# Patient Record
Sex: MALE | Race: WHITE | URBAN - METROPOLITAN AREA
[De-identification: names, ages, dates, MRNs, and addresses within clinical notes are randomized per-mention and may not be internally consistent; named-entity substitution may affect disease eponyms.]

---

## 2018-01-01 ENCOUNTER — OFFICE VISIT (OUTPATIENT)
Dept: RADIATION ONCOLOGY | Facility: CLINIC | Age: 63
End: 2018-01-01
Attending: RADIOLOGY
Payer: COMMERCIAL

## 2018-01-01 ENCOUNTER — MEDICAL CORRESPONDENCE (OUTPATIENT)
Dept: HEALTH INFORMATION MANAGEMENT | Facility: CLINIC | Age: 63
End: 2018-01-01

## 2018-01-01 ENCOUNTER — TELEPHONE (OUTPATIENT)
Dept: RADIATION ONCOLOGY | Facility: CLINIC | Age: 63
End: 2018-01-01

## 2018-01-01 ENCOUNTER — APPOINTMENT (OUTPATIENT)
Dept: MEDSURG UNIT | Facility: CLINIC | Age: 63
End: 2018-01-01
Payer: COMMERCIAL

## 2018-01-01 ENCOUNTER — PRE VISIT (OUTPATIENT)
Dept: RADIATION ONCOLOGY | Facility: CLINIC | Age: 63
End: 2018-01-01

## 2018-01-01 ENCOUNTER — HOSPITAL ENCOUNTER (OUTPATIENT)
Dept: MRI IMAGING | Facility: CLINIC | Age: 63
Discharge: HOME | End: 2018-05-02
Attending: NEUROLOGICAL SURGERY | Admitting: NEUROLOGICAL SURGERY
Payer: COMMERCIAL

## 2018-01-01 VITALS
OXYGEN SATURATION: 96 % | DIASTOLIC BLOOD PRESSURE: 68 MMHG | SYSTOLIC BLOOD PRESSURE: 117 MMHG | RESPIRATION RATE: 20 BRPM | HEART RATE: 103 BPM

## 2018-01-01 VITALS
BODY MASS INDEX: 27.15 KG/M2 | SYSTOLIC BLOOD PRESSURE: 132 MMHG | DIASTOLIC BLOOD PRESSURE: 75 MMHG | WEIGHT: 173 LBS | RESPIRATION RATE: 18 BRPM | HEART RATE: 125 BPM | OXYGEN SATURATION: 95 % | HEIGHT: 67 IN

## 2018-01-01 VITALS
SYSTOLIC BLOOD PRESSURE: 116 MMHG | RESPIRATION RATE: 18 BRPM | DIASTOLIC BLOOD PRESSURE: 70 MMHG | OXYGEN SATURATION: 97 % | HEART RATE: 80 BPM

## 2018-01-01 DIAGNOSIS — C79.31 METASTASIS TO BRAIN (H): ICD-10-CM

## 2018-01-01 DIAGNOSIS — C79.31 METASTASIS TO BRAIN (H): Primary | ICD-10-CM

## 2018-01-01 DIAGNOSIS — C79.49 SECONDARY MALIGNANT NEOPLASM OF BRAIN AND SPINAL CORD (H): Primary | ICD-10-CM

## 2018-01-01 DIAGNOSIS — C79.31 SECONDARY MALIGNANT NEOPLASM OF BRAIN AND SPINAL CORD (H): Primary | ICD-10-CM

## 2018-01-01 LAB
ANION GAP SERPL CALCULATED.3IONS-SCNC: 10 MMOL/L (ref 3–14)
CHLORIDE SERPL-SCNC: 98 MMOL/L (ref 94–109)
CO2 SERPL-SCNC: 27 MMOL/L (ref 20–32)
CREAT SERPL-MCNC: 0.41 MG/DL (ref 0.66–1.25)
GFR SERPL CREATININE-BSD FRML MDRD: >90 ML/MIN/1.7M2
POTASSIUM SERPL-SCNC: 4.1 MMOL/L (ref 3.4–5.3)
RADIOLOGIST FLAGS: ABNORMAL
SODIUM SERPL-SCNC: 135 MMOL/L (ref 133–144)

## 2018-01-01 PROCEDURE — 77371 SRS MULTISOURCE: CPT | Performed by: RADIOLOGY

## 2018-01-01 PROCEDURE — 36415 COLL VENOUS BLD VENIPUNCTURE: CPT | Performed by: NEUROLOGICAL SURGERY

## 2018-01-01 PROCEDURE — 77334 RADIATION TREATMENT AID(S): CPT | Performed by: RADIOLOGY

## 2018-01-01 PROCEDURE — G0463 HOSPITAL OUTPT CLINIC VISIT: HCPCS | Performed by: RADIOLOGY

## 2018-01-01 PROCEDURE — 25000128 H RX IP 250 OP 636: Performed by: NEUROLOGICAL SURGERY

## 2018-01-01 PROCEDURE — 77295 3-D RADIOTHERAPY PLAN: CPT | Performed by: RADIOLOGY

## 2018-01-01 PROCEDURE — 82565 ASSAY OF CREATININE: CPT | Performed by: NEUROLOGICAL SURGERY

## 2018-01-01 PROCEDURE — 25000132 ZZH RX MED GY IP 250 OP 250 PS 637: Mod: ZF | Performed by: NEUROLOGICAL SURGERY

## 2018-01-01 PROCEDURE — A9585 GADOBUTROL INJECTION: HCPCS | Performed by: NEUROLOGICAL SURGERY

## 2018-01-01 PROCEDURE — 77300 RADIATION THERAPY DOSE PLAN: CPT | Performed by: RADIOLOGY

## 2018-01-01 PROCEDURE — 80051 ELECTROLYTE PANEL: CPT | Performed by: NEUROLOGICAL SURGERY

## 2018-01-01 PROCEDURE — 77370 RADIATION PHYSICS CONSULT: CPT | Performed by: RADIOLOGY

## 2018-01-01 PROCEDURE — 70552 MRI BRAIN STEM W/DYE: CPT

## 2018-01-01 PROCEDURE — 25000131 ZZH RX MED GY IP 250 OP 636 PS 637: Mod: ZF | Performed by: NEUROLOGICAL SURGERY

## 2018-01-01 PROCEDURE — 25000125 ZZHC RX 250: Mod: ZF | Performed by: NEUROLOGICAL SURGERY

## 2018-01-01 PROCEDURE — 40000172 ZZH STATISTIC PROCEDURE PREP ONLY

## 2018-01-01 RX ORDER — LORAZEPAM 0.5 MG/1
.5-1 TABLET ORAL
Status: DISCONTINUED | OUTPATIENT
Start: 2018-01-01 | End: 2018-01-01 | Stop reason: HOSPADM

## 2018-01-01 RX ORDER — GADOBUTROL 604.72 MG/ML
10 INJECTION INTRAVENOUS ONCE
Status: COMPLETED | OUTPATIENT
Start: 2018-01-01 | End: 2018-01-01

## 2018-01-01 RX ORDER — TIOTROPIUM BROMIDE 18 UG/1
18 CAPSULE ORAL; RESPIRATORY (INHALATION) DAILY
COMMUNITY

## 2018-01-01 RX ORDER — HYDROCODONE BITARTRATE AND ACETAMINOPHEN 5; 325 MG/1; MG/1
1-2 TABLET ORAL EVERY 6 HOURS PRN
Status: DISCONTINUED | OUTPATIENT
Start: 2018-01-01 | End: 2018-01-01 | Stop reason: HOSPADM

## 2018-01-01 RX ORDER — ACETAMINOPHEN 325 MG/1
650 TABLET ORAL EVERY 4 HOURS PRN
Status: DISCONTINUED | OUTPATIENT
Start: 2018-01-01 | End: 2018-01-01 | Stop reason: HOSPADM

## 2018-01-01 RX ORDER — ONDANSETRON 2 MG/ML
4 INJECTION INTRAMUSCULAR; INTRAVENOUS
Status: DISCONTINUED | OUTPATIENT
Start: 2018-01-01 | End: 2018-01-01 | Stop reason: HOSPADM

## 2018-01-01 RX ORDER — DEXAMETHASONE 4 MG/1
4 TABLET ORAL
Status: COMPLETED | OUTPATIENT
Start: 2018-01-01 | End: 2018-01-01

## 2018-01-01 RX ORDER — ONDANSETRON 4 MG/1
8 TABLET, ORALLY DISINTEGRATING ORAL EVERY 6 HOURS PRN
Status: DISCONTINUED | OUTPATIENT
Start: 2018-01-01 | End: 2018-01-01 | Stop reason: HOSPADM

## 2018-01-01 RX ORDER — LORAZEPAM 0.5 MG/1
.5-2 TABLET ORAL
Status: COMPLETED | OUTPATIENT
Start: 2018-01-01 | End: 2018-01-01

## 2018-01-01 RX ORDER — LIDOCAINE 40 MG/G
1 CREAM TOPICAL SEE ADMIN INSTRUCTIONS
Status: COMPLETED | OUTPATIENT
Start: 2018-01-01 | End: 2018-01-01

## 2018-01-01 RX ADMIN — DEXAMETHASONE 4 MG: 4 TABLET ORAL at 11:35

## 2018-01-01 RX ADMIN — LIDOCAINE 1 G: 40 CREAM TOPICAL at 06:26

## 2018-01-01 RX ADMIN — GADOBUTROL 10 ML: 604.72 INJECTION INTRAVENOUS at 08:07

## 2018-01-01 RX ADMIN — LORAZEPAM 1 MG: 0.5 TABLET ORAL at 06:32

## 2018-01-01 RX ADMIN — BUPIVACAINE HYDROCHLORIDE 30 ML: 7.5 INJECTION, SOLUTION EPIDURAL; RETROBULBAR at 06:27

## 2018-01-01 ASSESSMENT — ENCOUNTER SYMPTOMS
SPUTUM PRODUCTION: 1
FEVER: 0
BLOOD IN STOOL: 0
HEADACHES: 0
CONSTIPATION: 0
DIZZINESS: 0
COUGH: 1
BLURRED VISION: 0
WEAKNESS: 1
SEIZURES: 0
DOUBLE VISION: 0
DEPRESSION: 0
VOMITING: 0
DIARRHEA: 0
SHORTNESS OF BREATH: 1
PHOTOPHOBIA: 0
MUSCULOSKELETAL NEGATIVE: 1

## 2018-04-10 NOTE — TELEPHONE ENCOUNTER
"Date: 4/10/2018   Age: 62 year old  Ethnicity:    Sex: male  : 1955   Lives In: [place], [place]      Diagnosis: Poorly Differentiated Adenocarcinoma, consistent with Lung primary    Prior radiation therapy:   Site Treated: at  Facility: at  Dates: at  Dose: at    Site Treated: at  Facility: at  Dates: at  Dose: at    Prior chemotherapy:   Protocol: at  Facility: at  Dates: at      Pain at time of consult, management plan if yes:  Does pt have a living will:  Is Pt Pregnant:  Does Pt have any implanted cardiac devices:    Doctors to \"cc\":  Sandeep Randolph-Med/Onc,     RN time with patient:    Pt was educated on Gamma Knife Procedure         Review Since Diagnosis:    Symptoms    2016; near complete obstruction of right lung      1/10/17; biopsy of tissue pt coughed up, showed poorly differentiated adenocarcinoma consistent with pulmonary primary    Chemo and Radiation, stable since    18; Brain MRI, new 1.3 x 1 cm enhancing lesion right temporal                                  Stable 0.8 x 0.7 cm lesion overlying the left parietal lobe                                  0.5 x 1 cm lesion right IAC (previously was 0.4 x 0.7 cm)    18; pt saw Dr. Randolph, put on 4 mg dexamethasone bid, wants a PET before decides what to do with brain lesion    18; PET/CT, mildly glucose avid right cervical lymph node-nonspecific, mediastinal soft tissue mass-like density unchanged in size-no activity to suggest progressing malignancy, no mets below diaphragm    18; pt saw Dr. Randolph, doing well, referred to neurosurgery for brain met     Chief Complaint: at consult               "

## 2018-04-18 NOTE — PROGRESS NOTES
Service Date: 04/18/2018      REFERRING PHYSICIANS:  Sandeep Randolph MD, Regan Lind PA-C, [facility] and Tumor Registry      DIAGNOSIS:  Metastatic nonsmall cell lung cancer (adenocarcinoma) with a solitary right temporal brain metastasis and a right acoustic neuroma.      HISTORY OF PRESENT ILLNESS:  The patient is a 62-year-old white male with a 45-pack-year smoking history who continues to smoke.  He has had decreased hearing in the right ear for 20 years and can no longer use the telephone with the right ear.  He was otherwise in stable health until 12/2016 when he developed shortness of breath.  Chest x-ray revealed complete opacification of the right lung.  On 01/10/2017 cytology from sputum confirmed nonsmall cell lung cancer (poorly differentiated adenocarcinoma).  He underwent concurrent chemotherapy and radiation in [place], [place] completed in 04/2017.  I do not have the details of that treatment.  He was clinically and radiographically stable until 02/02/2018 when a restaging brain MRI showed a new 1.3 cm metastasis in the right posterior temporal brain and a 1 cm right acoustic neuroma.  He was evaluated by Dr. Ayoub in Neurosurgery and the patient declined surgery due to risk of surgery-related deficits.  PET scan 02/07/2018 was stable with no evidence of progressive disease.  The patient had noticed some increasing difficulty with balance and actually fell on 03/12/2018 and sustained a fracture of the left wrist.  Followup brain MRI 04/16/2018 showed no significant change in the 1.2 cm-1.3 cm right temporal brain metastasis.  Similarly, there was no change in the 1.0 cm right acoustic neuroma.  The patient was referred for consideration of his treatment options.  His chief complaint is generalized weakness, chronic right-sided deafness, his fractured left wrist and fatigue.      PAST MEDICAL HISTORY:   1.  Tobacco abuse.   2.  COPD.   3.  Gastroesophageal reflux disease.    4.  Right acoustic neuroma with right-sided deafness.      MEDICATIONS:   1.  Dexamethasone 4 mg p.o. b.i.d.  (I will initiate a gradual dexamethasone taper as tolerated).   2.  Robitussin p.r.n.   3.  Spiriva p.r.n.      ALLERGIES:  Carboplatin and contrast dye.      REVIEW OF SYSTEMS:  All systems were reviewed and found to be otherwise negative.      FAMILY HISTORY:  His mother had breast cancer.  One uncle had colon cancer and another uncle had lung cancer.      SOCIAL HISTORY:  The patient is  and has 3 children.  He lives in [place], [place].  He is actually about to be remarry his first wife on 04/21/2018.  He is on disability from a career as a .      PHYSICAL EXAMINATION:   GENERAL:  Well-developed, well-nourished male appearing older than his stated age, sitting in a wheelchair, but in no acute distress, alert and oriented, pleasant and cooperative.   VITAL SIGNS:  Afebrile.  Vital signs stable.  Height 5 feet 7 inches.  Weight 173.  BMI 27.1.  Blood pressure 132/75.  Heart rate 110.  Respirations 12.  Oxygen saturation on room air 95%.   HEENT:  Normal for age.   NECK:  Supple without adenopathy.   LUNGS:  Reveal coarse rhonchi on the right and clear breath sounds on the left.   HEART:  Regular rate and rhythm.   ABDOMEN:  Soft, nontender.   EXTREMITIES:  Without cyanosis, clubbing or edema.   NEUROLOGIC:  Reveals a mental status that is alert and appropriate.  Cranial nerves are intact.  Motor exam reveals generalized weakness but no focal weakness.  Cerebellar testing shows he has difficulty ambulating independently.  He has a cast on his left wrist.      IMPRESSION:  Metastatic nonsmall cell lung cancer (adenocarcinoma) with a 1.3 cm right posterior temporal brain metastasis and a 1.0 cm right acoustic neuroma.  The acoustic neuroma may in fact be responsible for his difficulty with balance as well as his right-sided deafness.      RECOMMENDATIONS:  The diagnosis, prognosis  and therapeutic options were reviewed with the patient, his fiancee/wife and his brother.  The brain MRI images were reviewed with them and the brain metastasis and the acoustic neuroma were pointed out.  I do not recommend whole brain radiation therapy.  I do recommend gamma knife radiosurgery to both the brain metastasis and the acoustic neuroma since it, in fact, may be responsible for his chief complaint (difficulty with balance).  The rationale for this approach as well as the procedures, risks, benefits and potential side effects were explained.  The patient and his family appear to understand and agree to proceed.  The procedure is scheduled for 2018 at the [facility] at the [facility].  As noted above, I will taper his dexamethasone as tolerated.      Thank you very much for asking me to see this pleasant gentleman and to share in his evaluation.        Williams Philippe MD      cc:   [place] [address]      [place] [address]      Tumor Registry         WILLIAMS PHILIPPE MD             D: 2018   T: 2018   MT: nh      Name:     FANY LERNER   MRN:      [id number]        Account:      [id number]   :      1955           Service Date: 2018      Document: [id number]

## 2018-04-18 NOTE — LETTER
"2018       RE: Abdirizak Allen  [address]  [place] [address]     Dear Colleague,    Thank you for referring your patient, Abdirizak Allen, to the [facility], [place], RADIATION ONCOLOGY. Please see a copy of my visit note below.      HPI    Date: 4/10/2018   Age: 62 year old  Ethnicity:    Sex: male  : 1955   Lives In: [place], [place]      Diagnosis: Poorly Differentiated Adenocarcinoma, consistent with Lung primary    Prior radiation therapy:   Site Treated: right lung  Facility: [facility]  Dates: 2017  Dose: at      Prior chemotherapy: See Below        Pain at time of consult, management plan if yes: pt denies pain at time of consult  Does pt have a living will: pt has   Is Pt Pregnant: N?A  Does Pt have any implanted cardiac devices: pt has no implanted cardiac devices    Doctors to \"cc\":  Sandeep Randolph-Med/Onc, Dr.Andrea Lind-primary at [facility]    RN time with patient: 55 minutes    Pt was educated on Gamma Knife Procedure  ;yes        Review Since Diagnosis:    Pt stating in December knew he had to go to hospital, SOB, said pneumonia, went to ER, more testing    2016; near complete obstruction of right lung      1/10/17; biopsy of tissue pt coughed up, showed poorly differentiated adenocarcinoma consistent with pulmonary primary    Chemo and Radiation, stable since in [facility]    Chemo and Radiation was all done 2017    Routine checks, everything was good, no mets    Pt starting having trouble hearing in right ear and that is what prompted a Brain MRI    18; Brain MRI, new 1.3 x 1 cm enhancing lesion right temporal                                  Stable 0.8 x 0.7 cm lesion overlying the left parietal lobe                                  0.5 x 1 cm lesion right IAC (previously was 0.4 x 0.7 cm)    18; pt saw Dr. Randolph, put on 4 mg dexamethasone bid, wants a PET before decides what to do with brain lesion    18; PET/CT, " mildly glucose avid right cervical lymph node-nonspecific, mediastinal soft tissue mass-like density unchanged in size-no activity to suggest progressing malignancy, no mets below diaphragm    2/13/18; pt saw Dr. Randolph, doing well, referred to neurosurgery for brain met     No neurosurgeon in [place] per pt request    Chief Complaint: weakness all over especially legs, pt has fallen, broken left wrist, no improvement since steroid, pt denies a headache, pt also having some slurred speech and expressive aphagia            Review of Systems   Constitutional: Positive for malaise/fatigue. Negative for fever.   HENT: Positive for hearing loss.         Hearing decreased right ear   Eyes: Negative for blurred vision, double vision and photophobia.   Respiratory: Positive for cough, sputum production and shortness of breath.    Cardiovascular: Positive for leg swelling. Negative for chest pain.        Leg swelling bilateral, compression socks   Gastrointestinal: Negative for blood in stool, constipation, diarrhea and vomiting.        Pt trouble swallowing hard foods as chokes from saliva   Musculoskeletal: Negative.    Neurological: Positive for weakness. Negative for dizziness, seizures and headaches.   Psychiatric/Behavioral: Negative for depression.                 Service Date: 04/18/2018      REFERRING PHYSICIANS:  Sandeep Randolph MD, Regan Lind PA-C, [facility] and Tumor Registry      DIAGNOSIS:  Metastatic nonsmall cell lung cancer (adenocarcinoma) with a solitary right temporal brain metastasis and a right acoustic neuroma.      HISTORY OF PRESENT ILLNESS:  The patient is a 62-year-old white male with a 45-pack-year smoking history who continues to smoke.  He has had decreased hearing in the right ear for 20 years and can no longer use the telephone with the right ear.  He was otherwise in stable health until 12/2016 when he developed shortness of breath.  Chest x-ray revealed complete  opacification of the right lung.  On 01/10/2017 cytology from sputum confirmed nonsmall cell lung cancer (poorly differentiated adenocarcinoma).  He underwent concurrent chemotherapy and radiation in [place], [place] completed in 04/2017.  I do not have the details of that treatment.  He was clinically and radiographically stable until 02/02/2018 when a restaging brain MRI showed a new 1.3 cm metastasis in the right posterior temporal brain and a 1 cm right acoustic neuroma.  He was evaluated by Dr. Ayoub in Neurosurgery and the patient declined surgery due to risk of surgery-related deficits.  PET scan 02/07/2018 was stable with no evidence of progressive disease.  The patient had noticed some increasing difficulty with balance and actually fell on 03/12/2018 and sustained a fracture of the left wrist.  Followup brain MRI 04/16/2018 showed no significant change in the 1.2 cm-1.3 cm right temporal brain metastasis.  Similarly, there was no change in the 1.0 cm right acoustic neuroma.  The patient was referred for consideration of his treatment options.  His chief complaint is generalized weakness, chronic right-sided deafness, his fractured left wrist and fatigue.      PAST MEDICAL HISTORY:   1.  Tobacco abuse.   2.  COPD.   3.  Gastroesophageal reflux disease.   4.  Right acoustic neuroma with right-sided deafness.      MEDICATIONS:   1.  Dexamethasone 4 mg p.o. b.i.d.  (I will initiate a gradual dexamethasone taper as tolerated).   2.  Robitussin p.r.n.   3.  Spiriva p.r.n.      ALLERGIES:  Carboplatin and contrast dye.      REVIEW OF SYSTEMS:  All systems were reviewed and found to be otherwise negative.      FAMILY HISTORY:  His mother had breast cancer.  One uncle had colon cancer and another uncle had lung cancer.      SOCIAL HISTORY:  The patient is  and has 3 children.  He lives in [place], [place].  He is actually about to be remarry his first wife on 04/21/2018.  He is on disability from a  career as a .      PHYSICAL EXAMINATION:   GENERAL:  Well-developed, well-nourished male appearing older than his stated age, sitting in a wheelchair, but in no acute distress, alert and oriented, pleasant and cooperative.   VITAL SIGNS:  Afebrile.  Vital signs stable.  Height 5 feet 7 inches.  Weight 173.  BMI 27.1.  Blood pressure 132/75.  Heart rate 110.  Respirations 12.  Oxygen saturation on room air 95%.   HEENT:  Normal for age.   NECK:  Supple without adenopathy.   LUNGS:  Reveal coarse rhonchi on the right and clear breath sounds on the left.   HEART:  Regular rate and rhythm.   ABDOMEN:  Soft, nontender.   EXTREMITIES:  Without cyanosis, clubbing or edema.   NEUROLOGIC:  Reveals a mental status that is alert and appropriate.  Cranial nerves are intact.  Motor exam reveals generalized weakness but no focal weakness.  Cerebellar testing shows he has difficulty ambulating independently.  He has a cast on his left wrist.      IMPRESSION:  Metastatic nonsmall cell lung cancer (adenocarcinoma) with a 1.3 cm right posterior temporal brain metastasis and a 1.0 cm right acoustic neuroma.  The acoustic neuroma may in fact be responsible for his difficulty with balance as well as his right-sided deafness.      RECOMMENDATIONS:  The diagnosis, prognosis and therapeutic options were reviewed with the patient, his fiancee/wife and his brother.  The brain MRI images were reviewed with them and the brain metastasis and the acoustic neuroma were pointed out.  I do not recommend whole brain radiation therapy.  I do recommend gamma knife radiosurgery to both the brain metastasis and the acoustic neuroma since it, in fact, may be responsible for his chief complaint (difficulty with balance).  The rationale for this approach as well as the procedures, risks, benefits and potential side effects were explained.  The patient and his family appear to understand and agree to proceed.  The procedure is scheduled for  2018 at the [facility] at the [facility].  As noted above, I will taper his dexamethasone as tolerated.      Thank you very much for asking me to see this pleasant gentleman and to share in his evaluation.        Williams Philippe MD      cc:   [place] [address]      [place] [address]      Tumor Registry         WILLIAMS PHILIPPE MD             D: 2018   T: 2018   MT: nh      Name:     FANY LERNER   MRN:      [id number]        Account:      [id number]   :      1955           Service Date: 2018      Document: [id number]

## 2018-04-18 NOTE — MR AVS SNAPSHOT
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 [facility] [id number] [phone]            May 02, 2018  8:30 AM CDT   (Arrive by 8:15 AM)   MR BRAIN W CONTRAST with UUMR1   [facility] [place], MRI ([facility] [place] [facility], [facility])    [address] [id number] [phone]           Take your medicines as usual, unless your doctor tells you not to. Bring a list of your current medicines to your exam (including vitamins, minerals and over-the-counter drugs).  You may or may not receive intravenous (IV) contrast for this exam pending the discretion of the Radiologist.  You do not need to do anything special to prepare.  The MRI machine uses a strong magnet. Please wear clothes without metal (snaps, zippers). A sweatsuit works well, or we may give you a hospital gown.  Please remove any body piercings and hair extensions before you arrive. You will also remove watches, jewelry, hairpins, wallets, dentures, partial dental plates and hearing aids. You may wear contact lenses, and you may be able to wear your rings. We have a safe place to keep your personal items, but it is safer to leave them at home.  **IMPORTANT** THE INSTRUCTIONS BELOW ARE ONLY FOR THOSE PATIENTS WHO HAVE BEEN PRESCRIBED SEDATION OR GENERAL ANESTHESIA DURING THEIR MRI PROCEDURE:  IF YOUR DOCTOR PRESCRIBED ORAL SEDATION (take medicine to  help you relax during your exam):   You must get the medicine from your doctor (oral medication) before you arrive. Bring the medicine to the exam. Do not take it at home. You ll be told when to take it upon arriving for your exam.   Arrive one hour early. Bring someone who can take you home after the test. Your medicine will make you sleepy. After the exam, you may not drive, take a bus or take a taxi by yourself.  IF YOUR DOCTOR PRESCRIBED IV SEDATION:   Arrive one hour early. Bring someone who can take you home after the test. Your medicine will make you sleepy. After the exam, you may not drive, take a bus or take a taxi by yourself.   No eating 6 hours before your exam. You may have clear liquids up until 4 hours before your exam. (Clear liquids include water, clear tea, black coffee and fruit juice without pulp.)  IF YOUR DOCTOR PRESCRIBED ANESTHESIA (be asleep for your exam):   Arrive 1 1/2 hours early. Bring someone who can take you home after the test. You may not drive, take a bus or take a taxi by yourself.   No eating 8 hours before your exam. You may have clear liquids up until 4 hours before your exam. (Clear liquids include water, clear tea, black coffee and fruit juice without pulp.)   You will spend four to five hours in the recovery room.  Please call the Imaging Department at your exam site with any questions.              Who to contact     Please call your clinic at [phone] to:    Ask questions about your health    Make or cancel appointments    Discuss your medicines    Learn about your test results    Speak to your doctor            Additional Information About Your Visit        [employer] Information     [employer] gives you secure access to your electronic health record. If you see a primary care provider, you can also send messages to your care team and make appointments. If you have questions, please call your primary care clinic.  If you do not have a primary care provider, please call  "[phone] and they will assist you.      [employer] is an electronic gateway that provides easy, online access to your medical records. With [employer], you can request a clinic appointment, read your test results, renew a prescription or communicate with your care team.     To access your existing account, please contact your [facility] or call [phone] for assistance.        Care EveryWhere ID     This is your Care EveryWhere ID. This could be used by other organizations to access your [facility] medical records  [id number]        Your Vitals Were     Pulse Respirations Height Pulse Oximetry BMI (Body Mass Index)       125 18 1.702 m (5' 7\") 95% 27.1 kg/m2        Blood Pressure from Last 3 Encounters:   04/18/18 132/75    Weight from Last 3 Encounters:   04/18/18 78.5 kg (173 lb)              Today, you had the following     No orders found for display       Primary Care Provider    None Specified       No primary provider on file.        Equal Access to Services     CLIFFORD FLETCHER : Hadii jose de jesus landiso Soarias, waaxda luqadaha, qaybta kaalmada adericco, katilynn pope . So [facility] [phone].    ATENCIÓN: Si habla español, tiene a crenshaw disposición servicios gratuitos de asistencia lingüística. Llame al [phone].    We comply with applicable federal civil rights laws and [place] laws. We do not discriminate on the basis of race, color, national origin, age, disability, sex, sexual orientation, or gender identity.            Thank you!     Thank you for choosing [facility], RADIATION ONCOLOGY  for your care. Our goal is always to provide you with excellent care. Hearing back from our patients is one way we can continue to improve our services. Please take a few minutes to complete the written survey that you may receive in the mail after your visit with us. Thank you!             Your Updated Medication List - Protect others around you: Learn how to " safely use, store and throw away your medicines at www.disposemymeds.org.          This list is accurate as of 4/18/18 11:59 PM.  Always use your most recent med list.                   Brand Name Dispense Instructions for use Diagnosis    DEXAMETHASONE PO      Take 4 mg by mouth 2 times daily (with meals)        guaiFENesin 100 MG/5ML Syrp    ROBITUSSIN     Take 10 mLs by mouth        SAW PALMETTO CONCENTRATE OR      Take 450 mg by mouth daily        tiotropium 18 MCG capsule    SPIRIVA     Inhale 18 mcg into the lungs daily

## 2018-05-02 NOTE — MR AVS SNAPSHOT
After Visit Summary   5/2/2018    Abdirizak Allen    MRN: [id number]           Patient Information     Date Of Birth          1955        Visit Information        Provider Department      5/2/2018 6:30 AM Williams Balderrama MD [facility], [place], Radiation Oncology        Today's Diagnoses     Metastasis to brain (H)    -  1       Follow-ups after your visit        Future tests that were ordered for you today     Open Standing Orders        Priority Remaining Interval Expires Ordered    Oxygen: Nasal cannula Routine 37989/12892 PRN  5/2/2018    If Patient Diabetic: Glucose monitor nursing POCT Routine [id number] PRN 5/3/2018 5/2/2018            Who to contact     Please call your clinic at [phone] to:    Ask questions about your health    Make or cancel appointments    Discuss your medicines    Learn about your test results    Speak to your doctor            Additional Information About Your Visit        MyChart Information     [employer] gives you secure access to your electronic health record. If you see a primary care provider, you can also send messages to your care team and make appointments. If you have questions, please call your primary care clinic.  If you do not have a primary care provider, please call [phone] and they will assist you.      [employer] is an electronic gateway that provides easy, online access to your medical records. With [employer], you can request a clinic appointment, read your test results, renew a prescription or communicate with your care team.     To access your existing account, please contact your [facility] or call [phone] for assistance.        Care EveryWhere ID     This is your Care EveryWhere ID. This could be used by other organizations to access your [place] medical records  [id number]        Your Vitals Were     Pulse Respirations Pulse Oximetry             80 18 97%          Blood Pressure from Last 3  Encounters:   05/02/18 117/68   05/02/18 (P) 100/66   04/18/18 132/75    Weight from Last 3 Encounters:   04/18/18 78.5 kg (173 lb)              Today, you had the following     No orders found for display       Primary Care Provider Fax #    [facility] [phone] [address] [facility] [address]        Equal Access to Services     CLIFFORD FLETCHER : Hadii aad ku hadasho Soomaali, waaxda luqadaha, qaybta kaalmada adeegyada, waxay idiin hayaan adeeg carlosterrijose ladez . So [facility] [phone].    ATENCIÓN: Si habla español, tiene a crenshaw disposición servicios gratuitos de asistencia lingüística. Llame al [phone].    We comply with applicable federal civil rights laws and [place] laws. We do not discriminate on the basis of race, color, national origin, age, disability, sex, sexual orientation, or gender identity.            Thank you!     Thank you for choosing [facility], [place], RADIATION ONCOLOGY  for your care. Our goal is always to provide you with excellent care. Hearing back from our patients is one way we can continue to improve our services. Please take a few minutes to complete the written survey that you may receive in the mail after your visit with us. Thank you!             Your Updated Medication List - Protect others around you: Learn how to safely use, store and throw away your medicines at www.disposemymeds.org.          This list is accurate as of 5/2/18  3:25 PM.  Always use your most recent med list.                   Brand Name Dispense Instructions for use Diagnosis    DEXAMETHASONE PO      Take 4 mg by mouth 2 times daily (with meals)        guaiFENesin 100 MG/5ML Syrp    ROBITUSSIN     Take 10 mLs by mouth        SAW PALMETTO CONCENTRATE OR      Take 450 mg by mouth daily        tiotropium 18 MCG capsule    SPIRIVA     Inhale 18 mcg into the lungs daily

## 2018-05-02 NOTE — PROGRESS NOTES
Pt arrived to 2A with family for GK. VSS, pt denies pain. Food and drink offered. Call light within reach.

## 2018-05-02 NOTE — PROGRESS NOTES
GAMMA KNIFE RADIOSURGERY TREATMENT SUMMARY  DEPARTMENT OF RADIATION ONCOLOGY    Name: Abdirizak Allen                                        : 1955                 Medical Record #: [id number]                       Diagnosis:   Non small cell lung cancer and right acoustic neuroma                                  Date of Treatment: 2018                                       Referring Physicians:  Regan Watson ([facility])     BRIEF CLINICAL HISTORY:                                                                                                 The patient is a 62-year-old white male with a 45-pack-year smoking history who continues to smoke.  He has had decreased hearing in the right ear for 20 years and can no longer use the telephone with the right ear.  He was otherwise in stable health until 2016 when he developed shortness of breath.  Chest x-ray revealed complete opacification of the right lung.  On 01/10/2017 cytology from sputum confirmed nonsmall cell lung cancer (poorly differentiated adenocarcinoma).  He underwent concurrent chemotherapy and radiation in [place], [place] completed in 2017.  I do not have the details of that treatment.  He was clinically and radiographically stable until 2018 when a restaging brain MRI showed a new 1.3 cm metastasis in the right posterior temporal brain and a 1 cm right acoustic neuroma.  He was evaluated by Dr. Ayoub in Neurosurgery and the patient declined surgery due to risk of surgery-related deficits.  PET scan 2018 was stable with no evidence of progressive disease.  The patient had noticed some increasing difficulty with balance and actually fell on 2018 and sustained a fracture of the left wrist.  Followup brain MRI 2018 showed no significant change in the 1.2 cm-1.3 cm right temporal brain metastasis.  Similarly, there was no change in the 1.0 cm right acoustic neuroma.  The patient was  referred for consideration of his treatment options.  His chief complaint is generalized weakness, chronic right-sided deafness, his fractured left wrist and fatigue.   TECHNICAL SUMMARY        Collimators    Lesion Number Site Energy Minimum Tumor Dose (Gy) Isodose Line (%) Numbers Size (mm) Treatment Volume (cc)   1 Right Acoustic [place] 60 13 50 3 4 0.19   2 Right Temporal [place] 60 18 50 2 14 2.88     DESCRIPTION OF PROCEDURE:                                                                              On 5/2/2018 the patient was brought to the Gamma Knife suite at [facility].  After sedation and topical anesthetic, the head frame was put on by Dr. Ethan Bradshaw.  The patient was then taken to the department of Radiology where a stereotactic brain MRI was performed.  The patient was admitted to the [facility] where he rested comfortably while treatment planning was completed.  The [employer] Gamma Plan software was used to create a highly conformal dose distribution using the number and size collimators detailed above.  The patient was brought to the Gamma Knife suite.  The treatment was delivered using the Model C [employer] Gamma Knife without complication.  The head frame was removed and the patient was discharged home in stable condition.  FOLLOW-UP PLANS:                                                                                                        The Gamma Knife Nurse Coordinator will call the patient tomorrow for short-term follow up.  He should have a brain MRI in 2 months and every 3 months thereafter.  The patient will also follow-up with Dr Agustín (s).  We also noted significant steroid myopathy and a subdural on today's scan.  I tapered his dexamethasone which should finish by 5/14/18.  He should also have physical therapy.  I strongly encouraged him to do leg strengthening exercises. I would be happy to see him anytime.    Williams Balderrama MD, [facility],  [place]

## 2018-05-02 NOTE — PROGRESS NOTES
PREOPERATIVE DIAGNOSIS:  1.  Right acoustic neuroma  2.  Metastatic non-small cell lung cancer    POSTOPERATIVE DIAGNOSIS:  Same    OPERATIVE PROCEDURES:  1.  Gamma Knife radiosurgery to right acoustic neuroma, complex  2.  Gamma Knife radiosurgery to right temporal metastatic tumor  3.  Application of stereotactic head frame for stereotactic radiosurgery    SURGEON:  Ethan Bradshaw MD    ASSISTANT:  None    ANESTHESIA:  Local    INDICATIONS FOR THE PROCEDURE:  This patient has metastatic non-small cell lung cancer with a right temporal brain metastasis.  He also has a history of right-sided hearing loss and difficulty with balance.  Imaging revealed a 10mm right acoustic neuroma, in addition to the right temporal metastasis.  Gamma Knife stereotactic radiosurgery was recommended to treat the 2 lesions.    DESCRIPTION OF THE PROCEDURE:  On the morning of the procedure, the patient was brought to the Gamma Knife radiosurgery area.  A pre-procedure pause was performed to confirm the identity and date of birth of the patient, as well as the procedure site.  The scalp was prepped with betadine and then anesthetized with a combination of marcaine, lidocaine, and epinephrine.  The [place] G-frame was applied and the pins were fixed to hand tightness.  The patient tolerated the application of the frame well.  A depth helmet was placed and pin and post measurements were taken.    The patient was taken to the MRI scanner where an MRI with gadolinium contrast was obtained.  The patient returned from MRI and all measurements were checked to make sure that the frame had not moved.  The lesions were outlined on the planning software and we made a conformal dose outline for each of these lesions.  Dr. Balderrama selected the radiation dose for each lesion.  Measurements were taken,  steps performed, and the patient was then brought into the treatment room.  Radiation was given according to the  prescription.    The patient was then taken out of the unit and out of the treatment room.  The stereotactic frame was removed and a dressing was applied.  After observation, the patient was discharged.  Follow up arrangements will be made for the patient.    I attest that I was present for the entirety of the case, including pre-procedure assessment, stereotactic head frame placement, treatment planning, treatment delivery, as well as frame removal at the end of the treatment.

## 2018-05-02 NOTE — LETTER
5/2/2018       RE: Abdirizak Allen  [address]  [place] [address]     Dear Colleague,    Thank you for referring your patient, Abdirizak Allen, to the [facility], [place], RADIATION ONCOLOGY. Please see a copy of my visit note below.    PREOPERATIVE DIAGNOSIS:  1.  Right acoustic neuroma  2.  Metastatic non-small cell lung cancer    POSTOPERATIVE DIAGNOSIS:  Same    OPERATIVE PROCEDURES:  1.  Gamma Knife radiosurgery to right acoustic neuroma, complex  2.  Gamma Knife radiosurgery to right temporal metastatic tumor  3.  Application of stereotactic head frame for stereotactic radiosurgery    SURGEON:  Ethan Bradshaw MD    ASSISTANT:  None    ANESTHESIA:  Local    INDICATIONS FOR THE PROCEDURE:  This patient has metastatic non-small cell lung cancer with a right temporal brain metastasis.  He also has a history of right-sided hearing loss and difficulty with balance.  Imaging revealed a 10mm right acoustic neuroma, in addition to the right temporal metastasis.  Gamma Knife stereotactic radiosurgery was recommended to treat the 2 lesions.    DESCRIPTION OF THE PROCEDURE:  On the morning of the procedure, the patient was brought to the Gamma Knife radiosurgery area.  A pre-procedure pause was performed to confirm the identity and date of birth of the patient, as well as the procedure site.  The scalp was prepped with betadine and then anesthetized with a combination of marcaine, lidocaine, and epinephrine.  The [place] G-frame was applied and the pins were fixed to hand tightness.  The patient tolerated the application of the frame well.  A depth helmet was placed and pin and post measurements were taken.    The patient was taken to the MRI scanner where an MRI with gadolinium contrast was obtained.  The patient returned from MRI and all measurements were checked to make sure that the frame had not moved.  The lesions were outlined on the planning software and we made a conformal dose outline for each of these  lesions.  Dr. Balderrama selected the radiation dose for each lesion.  Measurements were taken,  steps performed, and the patient was then brought into the treatment room.  Radiation was given according to the prescription.    The patient was then taken out of the unit and out of the treatment room.  The stereotactic frame was removed and a dressing was applied.  After observation, the patient was discharged.  Follow up arrangements will be made for the patient.    I attest that I was present for the entirety of the case, including pre-procedure assessment, stereotactic head frame placement, treatment planning, treatment delivery, as well as frame removal at the end of the treatment.      GAMMA KNIFE RADIOSURGERY TREATMENT SUMMARY  DEPARTMENT OF RADIATION ONCOLOGY    Name: Abdirizak Allen                                        : 1955                 Medical Record #: [id number]                       Diagnosis:   Non small cell lung cancer and right acoustic neuroma                                  Date of Treatment: 2018                                       Referring Physicians:  Regan Watson ([facility])     BRIEF CLINICAL HISTORY:                                                                                                 The patient is a 62-year-old white male with a 45-pack-year smoking history who continues to smoke.  He has had decreased hearing in the right ear for 20 years and can no longer use the telephone with the right ear.  He was otherwise in stable health until 2016 when he developed shortness of breath.  Chest x-ray revealed complete opacification of the right lung.  On 01/10/2017 cytology from sputum confirmed nonsmall cell lung cancer (poorly differentiated adenocarcinoma).  He underwent concurrent chemotherapy and radiation in [place], [place] completed in 2017.  I do not have the details of that treatment.  He was clinically and  radiographically stable until 02/02/2018 when a restaging brain MRI showed a new 1.3 cm metastasis in the right posterior temporal brain and a 1 cm right acoustic neuroma.  He was evaluated by Dr. Ayoub in Neurosurgery and the patient declined surgery due to risk of surgery-related deficits.  PET scan 02/07/2018 was stable with no evidence of progressive disease.  The patient had noticed some increasing difficulty with balance and actually fell on 03/12/2018 and sustained a fracture of the left wrist.  Followup brain MRI 04/16/2018 showed no significant change in the 1.2 cm-1.3 cm right temporal brain metastasis.  Similarly, there was no change in the 1.0 cm right acoustic neuroma.  The patient was referred for consideration of his treatment options.  His chief complaint is generalized weakness, chronic right-sided deafness, his fractured left wrist and fatigue.   TECHNICAL SUMMARY        Collimators    Lesion Number Site Energy Minimum Tumor Dose (Gy) Isodose Line (%) Numbers Size (mm) Treatment Volume (cc)   1 Right Acoustic [place] 60 13 50 3 4 0.19   2 Right Temporal [place] 60 18 50 2 14 2.88     DESCRIPTION OF PROCEDURE:                                                                              On 5/2/2018 the patient was brought to the Gamma Knife suite at [facility].  After sedation and topical anesthetic, the head frame was put on by Dr. Ethan Bradshaw.  The patient was then taken to the department of Radiology where a stereotactic brain MRI was performed.  The patient was admitted to the [facility] where he rested comfortably while treatment planning was completed.  The [employer] Gamma Plan software was used to create a highly conformal dose distribution using the number and size collimators detailed above.  The patient was brought to the Gamma Knife suite.  The treatment was delivered using the Model C [employer] Gamma Knife without complication.  The head frame was  removed and the patient was discharged home in stable condition.  FOLLOW-UP PLANS:                                                                                                        The Gamma Knife Nurse Coordinator will call the patient tomorrow for short-term follow up.  He should have a brain MRI in 2 months and every 3 months thereafter.  The patient will also follow-up with Dr Agustín (s).  We also noted significant steroid myopathy and a subdural on today's scan.  I tapered his dexamethasone which should finish by 5/14/18.  He should also have physical therapy.  I strongly encouraged him to do leg strengthening exercises. I would be happy to see him anytime.    Williams Balderrama MD, [facility], [place]

## 2018-05-03 NOTE — TELEPHONE ENCOUNTER
RN called and spoke with Taras. Taras states he is feeling well. No c/o HA, states he is doing his exercises and will take off the dressing later today. No bleeding from the sites at this time. All questions answered.

## (undated) RX ORDER — LIDOCAINE 40 MG/G
CREAM TOPICAL
Status: DISPENSED
Start: 2018-01-01

## (undated) RX ORDER — DEXAMETHASONE 4 MG/1
TABLET ORAL
Status: DISPENSED
Start: 2018-01-01

## (undated) RX ORDER — LORAZEPAM 0.5 MG/1
TABLET ORAL
Status: DISPENSED
Start: 2018-01-01